# Patient Record
Sex: MALE | Race: WHITE | NOT HISPANIC OR LATINO | Employment: FULL TIME | URBAN - METROPOLITAN AREA
[De-identification: names, ages, dates, MRNs, and addresses within clinical notes are randomized per-mention and may not be internally consistent; named-entity substitution may affect disease eponyms.]

---

## 2020-12-28 ENCOUNTER — CLINICAL SUPPORT (OUTPATIENT)
Dept: URGENT CARE | Facility: CLINIC | Age: 25
End: 2020-12-28
Payer: MEDICAID

## 2020-12-28 DIAGNOSIS — Z11.59 SCREENING FOR VIRAL DISEASE: Primary | ICD-10-CM

## 2020-12-28 LAB
CTP QC/QA: YES
SARS-COV-2 RDRP RESP QL NAA+PROBE: NEGATIVE

## 2020-12-28 PROCEDURE — U0002: ICD-10-PCS | Mod: QW,S$GLB,, | Performed by: PHYSICIAN ASSISTANT

## 2020-12-28 PROCEDURE — U0002 COVID-19 LAB TEST NON-CDC: HCPCS | Mod: QW,S$GLB,, | Performed by: PHYSICIAN ASSISTANT

## 2020-12-28 PROCEDURE — 99211 PR OFFICE/OUTPT VISIT, EST, LEVL I: ICD-10-PCS | Mod: S$GLB,CS,, | Performed by: PHYSICIAN ASSISTANT

## 2020-12-28 PROCEDURE — 99211 OFF/OP EST MAY X REQ PHY/QHP: CPT | Mod: S$GLB,CS,, | Performed by: PHYSICIAN ASSISTANT

## 2020-12-28 NOTE — PATIENT INSTRUCTIONS
Your test was NEGATIVE for COVID-19 (coronavirus).      You may leave home and/or return to work when the following conditions are met:  · 24 hours fever free without fever-reducing medications AND  · Improved symptoms  · You have not met the conditions of a close contact     What counts as a close contact?  · You were within 6 feet of someone who has COVID-19 for a total of 15 minutes or more (masked or unmasked).  · You provided care at home to someone who is sick with COVID-19.  · You had direct physical contact with the person (hugged or kissed them).  · You shared eating or drinking utensils.  · They sneezed, coughed, or somehow got respiratory droplets on you.     If you had a close contact:  · If possible, it is recommended that you quarantine for 14 days from the time of contact regardless of your test status.  · If you have no symptoms, quarantine may be stopped early at 10 days, but this carries a small risk of spreading the virus.  · If you have no symptoms and you have a negative COVID test on day 5 or later, quarantine may be stopped after day 7, but this also carries a small risk of spreading the virus.     Additional instructions:  · Social distance per your local guidelines  · Call ahead before visiting your doctor.  · Wear a mask when around others who do not live in your household.  · Cover your coughs and sneezes.  · Wash hands or use hand  often.      If your symptoms worsen or if you have any other concerns, please contact [facility] at [phone].     Sincerely,    Christina Norris RT

## 2022-02-05 ENCOUNTER — OFFICE VISIT (OUTPATIENT)
Dept: URGENT CARE | Facility: CLINIC | Age: 27
End: 2022-02-05
Payer: MEDICAID

## 2022-02-05 VITALS
HEIGHT: 64 IN | TEMPERATURE: 98 F | DIASTOLIC BLOOD PRESSURE: 76 MMHG | BODY MASS INDEX: 24.75 KG/M2 | RESPIRATION RATE: 18 BRPM | OXYGEN SATURATION: 97 % | WEIGHT: 145 LBS | HEART RATE: 74 BPM | SYSTOLIC BLOOD PRESSURE: 114 MMHG

## 2022-02-05 DIAGNOSIS — K13.0 CELLULITIS OF LIP: Primary | ICD-10-CM

## 2022-02-05 PROCEDURE — 3008F BODY MASS INDEX DOCD: CPT | Mod: CPTII,S$GLB,, | Performed by: FAMILY MEDICINE

## 2022-02-05 PROCEDURE — 1159F PR MEDICATION LIST DOCUMENTED IN MEDICAL RECORD: ICD-10-PCS | Mod: CPTII,S$GLB,, | Performed by: FAMILY MEDICINE

## 2022-02-05 PROCEDURE — 3074F PR MOST RECENT SYSTOLIC BLOOD PRESSURE < 130 MM HG: ICD-10-PCS | Mod: CPTII,S$GLB,, | Performed by: FAMILY MEDICINE

## 2022-02-05 PROCEDURE — 3074F SYST BP LT 130 MM HG: CPT | Mod: CPTII,S$GLB,, | Performed by: FAMILY MEDICINE

## 2022-02-05 PROCEDURE — 3078F DIAST BP <80 MM HG: CPT | Mod: CPTII,S$GLB,, | Performed by: FAMILY MEDICINE

## 2022-02-05 PROCEDURE — 1159F MED LIST DOCD IN RCRD: CPT | Mod: CPTII,S$GLB,, | Performed by: FAMILY MEDICINE

## 2022-02-05 PROCEDURE — 3008F PR BODY MASS INDEX (BMI) DOCUMENTED: ICD-10-PCS | Mod: CPTII,S$GLB,, | Performed by: FAMILY MEDICINE

## 2022-02-05 PROCEDURE — 99213 PR OFFICE/OUTPT VISIT, EST, LEVL III, 20-29 MIN: ICD-10-PCS | Mod: S$GLB,,, | Performed by: FAMILY MEDICINE

## 2022-02-05 PROCEDURE — 3078F PR MOST RECENT DIASTOLIC BLOOD PRESSURE < 80 MM HG: ICD-10-PCS | Mod: CPTII,S$GLB,, | Performed by: FAMILY MEDICINE

## 2022-02-05 PROCEDURE — 99213 OFFICE O/P EST LOW 20 MIN: CPT | Mod: S$GLB,,, | Performed by: FAMILY MEDICINE

## 2022-02-05 RX ORDER — CEPHALEXIN 500 MG/1
500 CAPSULE ORAL EVERY 8 HOURS
Qty: 30 CAPSULE | Refills: 0 | Status: SHIPPED | OUTPATIENT
Start: 2022-02-05 | End: 2022-02-15

## 2022-02-05 RX ORDER — LORATADINE 10 MG/1
10 TABLET ORAL DAILY
Qty: 30 TABLET | Refills: 2 | Status: SHIPPED | OUTPATIENT
Start: 2022-02-05 | End: 2023-02-05

## 2022-02-05 NOTE — PROGRESS NOTES
"Subjective:       Patient ID: Alton Pool is a 26 y.o. male.    Vitals:  height is 5' 4" (1.626 m) and weight is 65.8 kg (145 lb). His axillary temperature is 97.9 °F (36.6 °C). His blood pressure is 114/76 and his pulse is 74. His respiration is 18 and oxygen saturation is 97%.     Chief Complaint: Lip Laceration (Bottom lip swollen)    Pt stated that his bottom lip began to swell up on yesterday and he did not used anything to treat.  Does not recall any bite,  Denies any itching        Facial Injury   The incident occurred 12 to 24 hours ago. The quality of the pain is described as aching. The pain is at a severity of 2/10. The patient is experiencing no pain. The pain has been constant since the injury. He has tried nothing for the symptoms. The treatment provided no relief.       HENT: Positive for facial trauma.        Objective:      Physical Exam   Constitutional: He is oriented to person, place, and time. He appears well-developed and well-nourished. He is cooperative.  Non-toxic appearance. He does not appear ill. No distress.   HENT:   Head: Normocephalic and atraumatic.   Ears:   Right Ear: Hearing, tympanic membrane, external ear and ear canal normal.   Left Ear: Hearing, tympanic membrane, external ear and ear canal normal.   Nose: Nose normal. No mucosal edema, rhinorrhea or nasal deformity. No epistaxis. Right sinus exhibits no maxillary sinus tenderness and no frontal sinus tenderness. Left sinus exhibits no maxillary sinus tenderness and no frontal sinus tenderness.   Mouth/Throat: Uvula is midline, oropharynx is clear and moist and mucous membranes are normal. Mucous membranes are moist. No trismus in the jaw. Normal dentition. No uvula swelling. No posterior oropharyngeal erythema. Oropharynx is clear.      Comments: Lower lip with moderate swelling ,   Small pimple seen on outer edge of lip    Eyes: Conjunctivae and lids are normal. Pupils are equal, round, and reactive to light. Right eye " exhibits no discharge. Left eye exhibits no discharge. No scleral icterus.      extraocular movement intact   Neck: Trachea normal and phonation normal. Neck supple.   Cardiovascular: Normal rate, regular rhythm, normal heart sounds, intact distal pulses and normal pulses.   Pulmonary/Chest: Effort normal and breath sounds normal. No respiratory distress.   Abdominal: Normal appearance and bowel sounds are normal. He exhibits no distension, no pulsatile midline mass and no mass. Soft. There is no abdominal tenderness.   Musculoskeletal: Normal range of motion.         General: No deformity or edema. Normal range of motion.   Neurological: He is alert and oriented to person, place, and time. He exhibits normal muscle tone. Coordination normal.   Skin: Skin is warm, dry, intact, not diaphoretic and not pale.   Psychiatric: He has a normal mood and affect. His speech is normal and behavior is normal. Judgment and thought content normal. Cognition and memory  Nursing note and vitals reviewed.        Assessment:       1. Cellulitis of lip          Plan:         Cellulitis of lip    Other orders  -     cephALEXin (KEFLEX) 500 MG capsule; Take 1 capsule (500 mg total) by mouth every 8 (eight) hours. for 10 days  Dispense: 30 capsule; Refill: 0  -     loratadine (CLARITIN) 10 mg tablet; Take 1 tablet (10 mg total) by mouth once daily.  Dispense: 30 tablet; Refill: 2          Apply ice    If increase swelling or difficulty swallowing RTC or to ER

## 2022-02-05 NOTE — PATIENT INSTRUCTIONS
Patient Education       Cellulitis and Erysipelas (Skin Infections)   The Basics   Written by the doctors and editors at [facility]   What are cellulitis and erysipelas? -- Cellulitis and erysipelas are both infections of the skin. These infections can cause redness, pain, and swelling. The difference between them is that erysipelas tends to affect the upper layers of skin, and cellulitis tends to affect deep layers of skin and sometimes the fat under the skin.  Cellulitis and erysipelas can happen when germs get into the skin. Normally, different types of germs live on a person's skin. Most of the time, these germs do not cause any problems. But if a person gets a cut or a break in the skin, the germs can get into the skin and cause an infection.  Certain conditions can increase a person's chance of getting cellulitis or erysipelas. These include:  · Having a cut (even a tiny one)  · Having another type of skin infection or a long-term skin condition  · Having swelling of the skin or swelling in the body  · Being overweight  What are the symptoms of cellulitis and erysipelas? -- Both types of infection cause very similar symptoms. Either infection can cause the infected area to be painful, red, swollen, or warm. Some people with cellulitis or erysipelas can sometimes also have fever or chills. And sometimes, people with these infections have no symptoms or only some of these symptoms.  Most of the time, cellulitis and erysipelas happen on the legs or arms. But people can get these infections in other places, such as the belly, the face, in the mouth, or around the anus.  Will I need tests? -- Most people do not need any tests. Your doctor or nurse will do an exam and look at your skin.  It's important for a doctor or nurse to do an exam to figure out what kind of infection you have. The right treatment for a skin infection depends on the type of infection it is and which germs are causing it. Your doctor or nurse  "might need to do tests to figure out the cause of your infection.  If you have cellulitis or erysipelas, it's important to get treated. These infections can spread to the whole body and become serious if not treated.  How are cellulitis and erysipelas treated? -- These infections are treated with antibiotic pills. If your doctor prescribes medicine for you to take at home, it is important to follow the directions exactly. Take all of the pills you are given, even if you feel better before you finish them. If you do not take all the pills, the infection can come back worse.  People who have severe infections might be treated in the hospital and given antibiotics through a thin tube that goes into the vein, called an "IV".  What can I do to help treat my infection? -- You can:  · Raise your arm or leg (if your infection is on your arm or leg) to reduce swelling - Raise the arm or leg up above the level of your heart 3 or 4 times a day, for 30 minutes each time.  · Keep the infected area clean and dry - You can take a shower or bath, but be sure to pat the area dry with a towel afterward.  Antibiotic ointments or creams do not work to treat cellulitis and erysipelas.  Should I call my doctor or nurse? -- You should call your doctor or nurse if your symptoms do not get better within 3 days of starting treatment. You should also call if the red area gets:  · Bigger  · More swollen  · More painful  Your doctor or nurse might do another exam or tests to see if you need different medicines.  Can skin infections be prevented? -- Sometimes. If you cut your skin, make sure to wash the area well with soap and water. This can help prevent the area from getting infected. If you have a long-term skin condition, ask your doctor or nurse what you can do to help prevent infections.  All topics are updated as new evidence becomes available and our peer review process is complete.  This topic retrieved from [employer] on: Sep 21, " 2021.  Topic 61783 Version 10.0  Release: 29.4.2 - C29.263  © 2021 UpToDate, Inc. and/or its affiliates. All rights reserved.  Consumer Information Use and Disclaimer   This information is not specific medical advice and does not replace information you receive from your health care provider. This is only a brief summary of general information. It does NOT include all information about conditions, illnesses, injuries, tests, procedures, treatments, therapies, discharge instructions or life-style choices that may apply to you. You must talk with your health care provider for complete information about your health and treatment options. This information should not be used to decide whether or not to accept your health care provider's advice, instructions or recommendations. Only your health care provider has the knowledge and training to provide advice that is right for you. The use of this information is governed by the [employer] End User License Agreement, available at https://www.expressor software.[employer]/en/solutions/[facility]/[email or web address]/daryn.The use of [employer] content is governed by the [employer] Terms of Use. ©2021 UpToDate, Inc. All rights reserved.  Copyright   © 2021 UpToDate, Inc. and/or its affiliates. All rights reserved.

## 2022-08-27 ENCOUNTER — OFFICE VISIT (OUTPATIENT)
Dept: URGENT CARE | Facility: CLINIC | Age: 27
End: 2022-08-27
Payer: COMMERCIAL

## 2022-08-27 VITALS
SYSTOLIC BLOOD PRESSURE: 129 MMHG | OXYGEN SATURATION: 98 % | BODY MASS INDEX: 26.29 KG/M2 | HEIGHT: 64 IN | DIASTOLIC BLOOD PRESSURE: 88 MMHG | RESPIRATION RATE: 18 BRPM | WEIGHT: 154 LBS | HEART RATE: 78 BPM | TEMPERATURE: 98 F

## 2022-08-27 DIAGNOSIS — S81.812A LACERATION OF LEFT LOWER LEG, INITIAL ENCOUNTER: Primary | ICD-10-CM

## 2022-08-27 PROCEDURE — 3008F BODY MASS INDEX DOCD: CPT | Mod: CPTII,S$GLB,, | Performed by: PHYSICIAN ASSISTANT

## 2022-08-27 PROCEDURE — 1160F PR REVIEW ALL MEDS BY PRESCRIBER/CLIN PHARMACIST DOCUMENTED: ICD-10-PCS | Mod: CPTII,S$GLB,, | Performed by: PHYSICIAN ASSISTANT

## 2022-08-27 PROCEDURE — 13121 CMPLX RPR S/A/L 2.6-7.5 CM: CPT | Mod: S$GLB,,, | Performed by: PHYSICIAN ASSISTANT

## 2022-08-27 PROCEDURE — 1159F PR MEDICATION LIST DOCUMENTED IN MEDICAL RECORD: ICD-10-PCS | Mod: CPTII,S$GLB,, | Performed by: PHYSICIAN ASSISTANT

## 2022-08-27 PROCEDURE — 12032 INTMD RPR S/A/T/EXT 2.6-7.5: CPT | Mod: S$GLB,,, | Performed by: PHYSICIAN ASSISTANT

## 2022-08-27 PROCEDURE — 3079F PR MOST RECENT DIASTOLIC BLOOD PRESSURE 80-89 MM HG: ICD-10-PCS | Mod: CPTII,S$GLB,, | Performed by: PHYSICIAN ASSISTANT

## 2022-08-27 PROCEDURE — 12032 LACERATION REPAIR: ICD-10-PCS | Mod: S$GLB,,, | Performed by: PHYSICIAN ASSISTANT

## 2022-08-27 PROCEDURE — 3074F PR MOST RECENT SYSTOLIC BLOOD PRESSURE < 130 MM HG: ICD-10-PCS | Mod: CPTII,S$GLB,, | Performed by: PHYSICIAN ASSISTANT

## 2022-08-27 PROCEDURE — 99213 PR OFFICE/OUTPT VISIT, EST, LEVL III, 20-29 MIN: ICD-10-PCS | Mod: 25,S$GLB,, | Performed by: PHYSICIAN ASSISTANT

## 2022-08-27 PROCEDURE — 3074F SYST BP LT 130 MM HG: CPT | Mod: CPTII,S$GLB,, | Performed by: PHYSICIAN ASSISTANT

## 2022-08-27 PROCEDURE — 13121 LACERATION REPAIR: ICD-10-PCS | Mod: S$GLB,,, | Performed by: PHYSICIAN ASSISTANT

## 2022-08-27 PROCEDURE — 3008F PR BODY MASS INDEX (BMI) DOCUMENTED: ICD-10-PCS | Mod: CPTII,S$GLB,, | Performed by: PHYSICIAN ASSISTANT

## 2022-08-27 PROCEDURE — 3079F DIAST BP 80-89 MM HG: CPT | Mod: CPTII,S$GLB,, | Performed by: PHYSICIAN ASSISTANT

## 2022-08-27 PROCEDURE — 99213 OFFICE O/P EST LOW 20 MIN: CPT | Mod: 25,S$GLB,, | Performed by: PHYSICIAN ASSISTANT

## 2022-08-27 PROCEDURE — 1159F MED LIST DOCD IN RCRD: CPT | Mod: CPTII,S$GLB,, | Performed by: PHYSICIAN ASSISTANT

## 2022-08-27 PROCEDURE — 1160F RVW MEDS BY RX/DR IN RCRD: CPT | Mod: CPTII,S$GLB,, | Performed by: PHYSICIAN ASSISTANT

## 2022-08-27 RX ORDER — MUPIROCIN 20 MG/G
OINTMENT TOPICAL 3 TIMES DAILY
Qty: 1 G | Refills: 0 | Status: SHIPPED | OUTPATIENT
Start: 2022-08-27

## 2022-08-27 NOTE — PATIENT INSTRUCTIONS
Monitor for evidence of infection. Wash and dry the area with regular soap and water. Do not use peroxide! If you develop swelling, redness, warmth or drainage from the wound, you should be evaluated as soon as possible. Sutures should be removed in 7-10 days (you can return here or be seen a primary care provider's clinic).     If not allergic,take tylenol (acetominophen) for fever control, chills, or body aches every 4 hours. Do not exceed 4000 mg/ day.If not allergic, take Motrin (Ibuprofen) every 4 hours for fever, chills, pain or inflammation. Do not exceed 2400 mg/day. You can alternate taking tylenol and motrin.     You must understand that you've received an Urgent Care treatment only and that you may be released before all your medical problems are known or treated. You, the patient, will arrange for follow up care as instructed.      Follow up with your PCP or specialty clinic as instructed in the next 2-3 days if not improved or as needed. You can call [phone] to schedule an appointment with appropriate provider.      If you condition worsens, we recommend that you receive another evaluation at the emergency room immediately or contact your primary medical clinic's after hours call service to discuss your concerns.      Please return here or go to the Emergency Department for any concerns or worsening condition.      If you were prescribed a narcotic or controlled substance, do not drive or operate heavy equipment or machinery while taking these medications.

## 2022-08-27 NOTE — PROGRESS NOTES
"Subjective:       Patient ID: Alton Pool is a 27 y.o. male.    Vitals:  height is 5' 4" (1.626 m) and weight is 69.9 kg (154 lb). His temperature is 98.2 °F (36.8 °C). His blood pressure is 129/88 and his pulse is 78. His respiration is 18 and oxygen saturation is 98%.     Chief Complaint: Laceration    Patient presents to the clinic with an injury to the left leg x today    Patient provider note starts here:  Patient presents to the clinic with complaints of a laceration to the left lower leg sustained today.  Reports that [place] there was a miscommunication with his brother who he was lifting it with causing him to be cut with a concrete on the pole. Incident occurred about an hour prior to arrival. Denies other injuries. Tetanus is UTD (2021).     Leg Pain   The incident occurred 1 to 3 hours ago. The injury mechanism was a fall. The pain is present in the left leg. The pain is at a severity of 2/10. The pain is mild. The pain has been Fluctuating since onset. Pertinent negatives include no numbness. Possible foreign bodies include glass. He has tried nothing for the symptoms. The treatment provided no relief.     Constitution: Negative for chills and fever.   HENT:  Negative for sore throat.    Neck: Negative for neck pain and neck stiffness.   Cardiovascular:  Negative for chest pain.   Respiratory:  Negative for chest tightness, cough and wheezing.    Gastrointestinal:  Negative for abdominal pain, vomiting and diarrhea.   Musculoskeletal:  Negative for pain.   Skin:  Positive for wound and laceration. Negative for rash.   Allergic/Immunologic: Negative for itching.   Neurological:  Negative for numbness and tingling.     Objective:      Physical Exam   Constitutional: He is oriented to person, place, and time. He appears well-developed. He is cooperative.  Non-toxic appearance. He does not appear ill. No distress.   HENT:   Head: Normocephalic and atraumatic. "   Ears:   Right Ear: Hearing and external ear normal.   Left Ear: Hearing and external ear normal.   Nose: Nose normal. No mucosal edema, rhinorrhea or nasal deformity. No epistaxis. Right sinus exhibits no maxillary sinus tenderness and no frontal sinus tenderness. Left sinus exhibits no maxillary sinus tenderness and no frontal sinus tenderness.   Mouth/Throat: Uvula is midline, oropharynx is clear and moist and mucous membranes are normal. No trismus in the jaw. Normal dentition. No uvula swelling. No posterior oropharyngeal erythema.   Eyes: Conjunctivae and lids are normal. Right eye exhibits no discharge. Left eye exhibits no discharge. No scleral icterus.   Neck: Trachea normal and phonation normal. Neck supple.   Cardiovascular: Normal rate and normal pulses.   Pulmonary/Chest: Effort normal. No respiratory distress.   Abdominal: Normal appearance. There is no abdominal tenderness.   Musculoskeletal: Normal range of motion.         General: No deformity. Normal range of motion.   Neurological: He is alert and oriented to person, place, and time. He exhibits normal muscle tone. Coordination normal.   Skin: Skin is warm, dry, intact, not diaphoretic and not pale.         Comments: 2 lacerations noted to the anterior aspect of the left lower extremity. There is no active bleeding. One of the lacerations contains a flap of skin and a FB of glass is noted under the flap of skin.      Psychiatric: His speech is normal and behavior is normal. Judgment and thought content normal.   Nursing note and vitals reviewed.      Assessment:       1. Laceration of left lower leg, initial encounter        Laceration Repair    Date/Time: 8/27/2022 12:15 PM  Performed by: Denise Wyatt PA-C  Authorized by: Denise Wyatt PA-C   Consent Done: Yes  Consent: Verbal consent obtained.  Risks and benefits: risks, benefits and alternatives were discussed  Consent given by: patient  Body area: lower extremity  Location  details: left lower leg  Laceration length: 3 cm  Foreign bodies: no foreign bodies  Tendon involvement: none  Nerve involvement: none  Anesthesia: local infiltration    Anesthesia:  Local Anesthetic: lidocaine 1% without epinephrine  Anesthetic total: 2 mL  Preparation: Patient was prepped and draped in the usual sterile fashion.  Irrigation solution: saline  Irrigation method: syringe  Amount of cleaning: extensive  Skin closure: 4-0 nylon  Number of sutures: 3  Technique: simple  Approximation: close  Approximation difficulty: simple  Dressing: dressing applied and non-stick sterile dressing  Patient tolerance: Patient tolerated the procedure well with no immediate complications        Laceration Repair    Date/Time: 8/27/2022 12:15 PM  Performed by: Denise Wyatt PA-C  Authorized by: Denise Wyatt PA-C   Consent Done: Yes  Consent: Verbal consent obtained.  Risks and benefits: risks, benefits and alternatives were discussed  Consent given by: patient  Body area: lower extremity  Location details: left lower leg  Laceration length: 5 cm  Foreign bodies: glass  Tendon involvement: none  Nerve involvement: none  Anesthesia: local infiltration    Anesthesia:  Local Anesthetic: lidocaine 1% without epinephrine  Anesthetic total: 4 mL  Preparation: Patient was prepped and draped in the usual sterile fashion.  Irrigation solution: saline  Irrigation method: syringe  Amount of cleaning: extensive  Debridement: moderate  Skin closure: 4-0 nylon  Number of sutures: 7  Technique: simple  Approximation: close  Approximation difficulty: complex  Dressing: dressing applied and non-stick sterile dressing  Patient tolerance: Patient tolerated the procedure well with no immediate complications        Plan:         Laceration of left lower leg, initial encounter  -     mupirocin (BACTROBAN) 2 % ointment; Apply topically 3 (three) times daily.  Dispense: 1 g; Refill: 0  -     Laceration Repair  -     Laceration  Repair         Medical Decision Making:   History:   Old Medical Records: I decided to obtain old medical records.  Differential Diagnosis:   Differential diagnosis includes but is not limited to: laceration, abrasion, retained FB, tendon injury   Urgent Care Management:  Patient presents with complaints of 2 lacerations to the left lower leg.  On exam, he is afebrile and nontoxic appearing.  Alert piece of glass was removed from the superior most laceration which did contain a flap of skin.  Lacerations were sutured by myself without complications.  Advised wound care and suture removal in 7-10 days.  Tetanus UTD. Patient verbalized understanding and agreed with plan.     Patient Instructions   Monitor for evidence of infection. Wash and dry the area with regular soap and water. Do not use peroxide! If you develop swelling, redness, warmth or drainage from the wound, you should be evaluated as soon as possible. Sutures should be removed in 7-10 days (you can return here or be seen a primary care provider's clinic).     If not allergic,take tylenol (acetominophen) for fever control, chills, or body aches every 4 hours. Do not exceed 4000 mg/ day.If not allergic, take Motrin (Ibuprofen) every 4 hours for fever, chills, pain or inflammation. Do not exceed 2400 mg/day. You can alternate taking tylenol and motrin.     You must understand that you've received an Urgent Care treatment only and that you may be released before all your medical problems are known or treated. You, the patient, will arrange for follow up care as instructed.      Follow up with your PCP or specialty clinic as instructed in the next 2-3 days if not improved or as needed. You can call [phone] to schedule an appointment with appropriate provider.      If you condition worsens, we recommend that you receive another evaluation at the emergency room immediately or contact your primary medical clinic's after hours call service to discuss your  concerns.      Please return here or go to the Emergency Department for any concerns or worsening condition.      If you were prescribed a narcotic or controlled substance, do not drive or operate heavy equipment or machinery while taking these medications.

## 2022-08-28 ENCOUNTER — CLINICAL SUPPORT (OUTPATIENT)
Dept: URGENT CARE | Facility: CLINIC | Age: 27
End: 2022-08-28
Payer: MEDICAID

## 2022-08-28 ENCOUNTER — TELEPHONE (OUTPATIENT)
Dept: URGENT CARE | Facility: CLINIC | Age: 27
End: 2022-08-28

## 2022-08-28 VITALS
HEIGHT: 64 IN | HEART RATE: 76 BPM | WEIGHT: 154 LBS | BODY MASS INDEX: 26.29 KG/M2 | OXYGEN SATURATION: 95 % | RESPIRATION RATE: 12 BRPM | SYSTOLIC BLOOD PRESSURE: 117 MMHG | DIASTOLIC BLOOD PRESSURE: 77 MMHG | TEMPERATURE: 98 F

## 2022-08-28 DIAGNOSIS — Z51.89 VISIT FOR WOUND CHECK: Primary | ICD-10-CM

## 2022-08-28 PROCEDURE — 99499 NO LOS: ICD-10-PCS | Mod: S$GLB,,, | Performed by: PHYSICIAN ASSISTANT

## 2022-08-28 PROCEDURE — 99499 UNLISTED E&M SERVICE: CPT | Mod: S$GLB,,, | Performed by: PHYSICIAN ASSISTANT

## 2022-08-28 NOTE — TELEPHONE ENCOUNTER
Called and checked on patient to ensure that there has not been continued bleeding from the sutures. Reports that the wounds have not bled since early this morning prior to reevaluation here.

## 2022-08-28 NOTE — PROGRESS NOTES
"Subjective:       Patient ID: Alton Pool is a 27 y.o. male.    Vitals:  height is 5' 4" (1.626 m) and weight is 69.9 kg (154 lb). His temperature is 98 °F (36.7 °C). His blood pressure is 117/77 and his pulse is 76. His respiration is 12 and oxygen saturation is 95%.     Chief Complaint: Wound Check    Pt was seen yesterday for laceration, since leaving the clinic he has been having bleeding at the bottom of wound and this morning the top of wound began bleeding. It currently is not bleeding and pt denies any pus or liquid coming from the area. Pt does have swelling and what he describes as thin red blood coming from the area.     Patient provider note starts here:  Patient presents to the clinic with complaints of some bleeding from the lacerations which were sutured by me yesterday. Reports that he had some bleeding yesterday and some again this morning from the lateral/inferior aspect of the inferior laceration. No bleeding currently.       Wound Check  He was originally treated yesterday.     Constitution: Negative for chills and fever.   HENT:  Negative for sore throat.    Neck: Negative for neck pain and neck stiffness.   Cardiovascular:  Negative for chest pain.   Respiratory:  Negative for chest tightness, cough and wheezing.    Gastrointestinal:  Negative for abdominal pain, vomiting and diarrhea.   Musculoskeletal:  Negative for pain.   Skin:  Positive for laceration (s/p surture palcement yesterday). Negative for rash, wound, erythema and bruising.   Allergic/Immunologic: Negative for itching.   Neurological:  Negative for numbness and tingling.     Objective:      Physical Exam   Constitutional: He is oriented to person, place, and time. He appears well-developed.   HENT:   Head: Normocephalic and atraumatic. Head is without abrasion, without contusion and without laceration.   Ears:   Right Ear: External ear normal.   Left Ear: External ear normal.   Nose: Nose normal.   Mouth/Throat: Oropharynx is " clear and moist and mucous membranes are normal.   Eyes: Conjunctivae, EOM and lids are normal. Pupils are equal, round, and reactive to light.   Neck: Trachea normal and phonation normal. Neck supple.   Cardiovascular: Normal rate.   Pulmonary/Chest: Effort normal. No stridor. No respiratory distress.   Musculoskeletal: Normal range of motion.         General: Normal range of motion.   Neurological: He is alert and oriented to person, place, and time.   Skin: Skin is warm, dry, intact and no rash. Capillary refill takes less than 2 seconds. No abrasion, No burn, No bruising, No erythema and No ecchymosis         Comments: Two lacerations noted with sutures in place.  No active bleeding.  There is some dried blood noted to the skin.  Mild swelling surrounding the laceration sites likely from the trauma.  No evidence of infection.   Psychiatric: His speech is normal and behavior is normal. Judgment and thought content normal.   Nursing note and vitals reviewed.      Assessment:       1. Visit for wound check          Plan:         Visit for wound check         Medical Decision Making:   History:   Old Medical Records: I decided to obtain old medical records.  Urgent Care Management:  Patient presents with complaints of some bleeding from the lacerations which were sutured by myself yesterday.  On exam, he is afebrile and nontoxic appearing.  No active bleeding noted.  I encouraged the patient wound care and pressure dressing if bleeding persists.  I believe that the lacerations appear well overall.  There is mild swelling which is likely related to trauma.  I do not suspect infectious causes at this time.  Advised follow-up in 7-10 days for suture removal.  He verbalized understanding.

## 2022-09-05 ENCOUNTER — CLINICAL SUPPORT (OUTPATIENT)
Dept: URGENT CARE | Facility: CLINIC | Age: 27
End: 2022-09-05
Payer: MEDICAID

## 2022-09-05 VITALS
HEART RATE: 56 BPM | WEIGHT: 154 LBS | DIASTOLIC BLOOD PRESSURE: 71 MMHG | OXYGEN SATURATION: 98 % | RESPIRATION RATE: 17 BRPM | SYSTOLIC BLOOD PRESSURE: 111 MMHG | TEMPERATURE: 98 F | HEIGHT: 64 IN | BODY MASS INDEX: 26.29 KG/M2

## 2022-09-05 DIAGNOSIS — Z48.02 VISIT FOR SUTURE REMOVAL: Primary | ICD-10-CM

## 2022-09-05 PROCEDURE — 99499 NO LOS: ICD-10-PCS | Mod: S$GLB,,, | Performed by: PHYSICIAN ASSISTANT

## 2022-09-05 PROCEDURE — 99024 SUTURE REMOVAL: ICD-10-PCS | Mod: S$GLB,,, | Performed by: PHYSICIAN ASSISTANT

## 2022-09-05 PROCEDURE — 99499 UNLISTED E&M SERVICE: CPT | Mod: S$GLB,,, | Performed by: PHYSICIAN ASSISTANT

## 2022-09-05 PROCEDURE — 99024 POSTOP FOLLOW-UP VISIT: CPT | Mod: S$GLB,,, | Performed by: PHYSICIAN ASSISTANT

## 2022-09-05 NOTE — PROCEDURES
Suture Removal    Date/Time: 9/5/2022 10:30 AM  Location procedure was performed: [facility]  Performed by: Enedelia Patel PA-C  Authorized by: Enedelia Patel PA-C   Pre-operative diagnosis: laceration  Post-operative diagnosis: laceration  Body area: lower extremity  Location details: left lower leg  Wound Appearance: clean, well healed, normal color, nontender, no drainage and nonpurulent  Sutures Removed: 12  Facility: sutures placed in this facility  Complications: No  Estimated blood loss (mL): 0  Specimens: No  Implants: No  Patient tolerance: Patient tolerated the procedure well with no immediate complications

## 2022-09-05 NOTE — PROGRESS NOTES
"Subjective:       Patient ID: Alton Pool is a 27 y.o. male.    Vitals:  height is 5' 4" (1.626 m) and weight is 69.9 kg (154 lb). His temperature is 98 °F (36.7 °C). His blood pressure is 111/71 and his pulse is 56 (abnormal). His respiration is 17 and oxygen saturation is 98%.     Chief Complaint: Suture / Staple Removal    Mr. Pool presents for suture removal.  He had 12 sutures placed to the left lower extremity on 08/27/22.  He denies any pus drainage, fever, chills.  He has been applying Bactroban ointment as directed.  He reports the area is still little sore, but pain is improved.    Suture / Staple Removal  The sutures were placed 7 to 10 days ago. He tried regular soap and water washings since the wound repair. The treatment provided mild relief. His temperature was unmeasured prior to arrival. There has been no drainage from the wound. There is no redness present. The swelling has improved. There is no pain present.     Constitution: Negative for chills, sweating, fatigue and fever.   HENT:  Negative for ear pain, ear discharge, congestion, postnasal drip, sinus pain, sinus pressure and sore throat.    Neck: Negative for neck pain.   Cardiovascular:  Negative for chest trauma, chest pain, leg swelling, palpitations, sob on exertion and passing out.   Eyes:  Negative for blurred vision.   Respiratory:  Negative for cough and shortness of breath.    Gastrointestinal:  Negative for abdominal pain, nausea, vomiting and diarrhea.   Genitourinary:  Negative for dysuria, frequency and urgency.   Musculoskeletal:  Negative for pain and trauma.   Skin:  Positive for wound. Negative for rash, erythema and hives.   Allergic/Immunologic: Negative for hives and itching.   Neurological:  Negative for dizziness, history of vertigo, light-headedness, passing out, facial drooping, speech difficulty, coordination disturbances, loss of balance, headaches and altered mental status.   Hematologic/Lymphatic: Negative " for easy bruising/bleeding. Does not bruise/bleed easily.   Psychiatric/Behavioral:  Negative for altered mental status.      Objective:      Physical Exam   Constitutional: He is oriented to person, place, and time. He appears well-developed.   HENT:   Head: Normocephalic and atraumatic. Head is without abrasion, without contusion and without laceration.   Ears:   Right Ear: External ear normal.   Left Ear: External ear normal.   Nose: Nose normal.   Mouth/Throat: Oropharynx is clear and moist and mucous membranes are normal.   Eyes: Conjunctivae, EOM and lids are normal. Pupils are equal, round, and reactive to light.   Neck: Trachea normal and phonation normal. Neck supple.   Cardiovascular: Normal rate, regular rhythm and normal heart sounds.   Pulmonary/Chest: Effort normal and breath sounds normal. No stridor. No respiratory distress.   Musculoskeletal: Normal range of motion.         General: Normal range of motion.   Neurological: He is alert and oriented to person, place, and time.   Skin: Skin is warm, dry, intact and no rash. Capillary refill takes less than 2 seconds. No abrasion, No burn, No bruising, No erythema and No ecchymosis        Psychiatric: His speech is normal and behavior is normal. Judgment and thought content normal.   Nursing note and vitals reviewed.      Assessment:       1. Visit for suture removal          Plan:         Visit for suture removal  -     Suture Removal    Diagnoses and plan discussed with the patient, as well as the expected course and duration of his symptoms.  All questions and concerns were addressed prior to discharge.  He was advised to follow up with his PCP within 1 week if symptoms do not improve.  Emergency department precautions were given.  Patient verbalized understanding and was happy with the plan of care.   Note dictated with voice recognition software, please excuse any grammatical errors.    Patient Instructions   PLEASE READ YOUR DISCHARGE INSTRUCTIONS  ENTIRELY AS IT CONTAINS IMPORTANT INFORMATION.  Clean the wound twice daily with soap & water.  Do not soak in any water & do not get in pools, hot tubs, or the ocean/any body of water until healed.  Keep covered.  You may apply bactroban once a day to the wound.    - Rest.    - Drink plenty of fluids.    - Tylenol or Ibuprofen as directed as needed for fever/pain.    - If you were prescribed antibiotics, please take them to completion.  - If you are female and on birth control pills - please use additional methods of contraception to prevent pregnancy while on antibiotics and for one cycle after.   - If you were prescribed a narcotic medication, a cough syrup, or a muscle relaxer, do not drive or operate heavy equipment or machinery while taking these medications, as they can cause drowsiness.   - If a referral to a specialty was made today, you should receive a phone call in the next few days to schedule an appt.  Please call [phone] to schedule an appt if have not gotten a phone call in the next few days.  - If you smoke, please stop smoking.  -You must understand that you've received an Urgent Care treatment only and that you may be released before all your medical problems are known or treated. You, the patient, will arrange for follow up care as instructed. Please arrange follow up with your primary medical clinic as soon as possible.   - Follow up with your PCP or specialty clinic as directed in the next 1-2 weeks if not improved or as needed.  You can call [phone] to schedule an appointment with the appropriate provider.    - Please return to Urgent Care or to the Emergency Department if your symptoms worsen.    Patient aware and verbalized understanding.

## 2022-09-05 NOTE — PATIENT INSTRUCTIONS
PLEASE READ YOUR DISCHARGE INSTRUCTIONS ENTIRELY AS IT CONTAINS IMPORTANT INFORMATION.  Clean the wound twice daily with soap & water.  Do not soak in any water & do not get in pools, hot tubs, or the ocean/any body of water until healed.  Keep covered.  You may apply bactroban once a day to the wound.    - Rest.    - Drink plenty of fluids.    - Tylenol or Ibuprofen as directed as needed for fever/pain.    - If you were prescribed antibiotics, please take them to completion.  - If you are female and on birth control pills - please use additional methods of contraception to prevent pregnancy while on antibiotics and for one cycle after.   - If you were prescribed a narcotic medication, a cough syrup, or a muscle relaxer, do not drive or operate heavy equipment or machinery while taking these medications, as they can cause drowsiness.   - If a referral to a specialty was made today, you should receive a phone call in the next few days to schedule an appt.  Please call [phone] to schedule an appt if have not gotten a phone call in the next few days.  - If you smoke, please stop smoking.  -You must understand that you've received an Urgent Care treatment only and that you may be released before all your medical problems are known or treated. You, the patient, will arrange for follow up care as instructed. Please arrange follow up with your primary medical clinic as soon as possible.   - Follow up with your PCP or specialty clinic as directed in the next 1-2 weeks if not improved or as needed.  You can call [phone] to schedule an appointment with the appropriate provider.    - Please return to Urgent Care or to the Emergency Department if your symptoms worsen.    Patient aware and verbalized understanding.

## 2022-09-09 ENCOUNTER — OFFICE VISIT (OUTPATIENT)
Dept: URGENT CARE | Facility: CLINIC | Age: 27
End: 2022-09-09
Payer: COMMERCIAL

## 2022-09-09 VITALS
WEIGHT: 154 LBS | BODY MASS INDEX: 26.29 KG/M2 | HEART RATE: 69 BPM | HEIGHT: 64 IN | DIASTOLIC BLOOD PRESSURE: 74 MMHG | OXYGEN SATURATION: 97 % | TEMPERATURE: 98 F | RESPIRATION RATE: 14 BRPM | SYSTOLIC BLOOD PRESSURE: 109 MMHG

## 2022-09-09 DIAGNOSIS — L08.9 INFECTED WOUND: Primary | ICD-10-CM

## 2022-09-09 DIAGNOSIS — T14.8XXA INFECTED WOUND: Primary | ICD-10-CM

## 2022-09-09 PROCEDURE — 3078F DIAST BP <80 MM HG: CPT | Mod: CPTII,S$GLB,, | Performed by: PHYSICIAN ASSISTANT

## 2022-09-09 PROCEDURE — 3078F PR MOST RECENT DIASTOLIC BLOOD PRESSURE < 80 MM HG: ICD-10-PCS | Mod: CPTII,S$GLB,, | Performed by: PHYSICIAN ASSISTANT

## 2022-09-09 PROCEDURE — 3008F BODY MASS INDEX DOCD: CPT | Mod: CPTII,S$GLB,, | Performed by: PHYSICIAN ASSISTANT

## 2022-09-09 PROCEDURE — 1160F PR REVIEW ALL MEDS BY PRESCRIBER/CLIN PHARMACIST DOCUMENTED: ICD-10-PCS | Mod: CPTII,S$GLB,, | Performed by: PHYSICIAN ASSISTANT

## 2022-09-09 PROCEDURE — 3074F SYST BP LT 130 MM HG: CPT | Mod: CPTII,S$GLB,, | Performed by: PHYSICIAN ASSISTANT

## 2022-09-09 PROCEDURE — 99213 PR OFFICE/OUTPT VISIT, EST, LEVL III, 20-29 MIN: ICD-10-PCS | Mod: S$GLB,,, | Performed by: PHYSICIAN ASSISTANT

## 2022-09-09 PROCEDURE — 1159F PR MEDICATION LIST DOCUMENTED IN MEDICAL RECORD: ICD-10-PCS | Mod: CPTII,S$GLB,, | Performed by: PHYSICIAN ASSISTANT

## 2022-09-09 PROCEDURE — 99213 OFFICE O/P EST LOW 20 MIN: CPT | Mod: S$GLB,,, | Performed by: PHYSICIAN ASSISTANT

## 2022-09-09 PROCEDURE — 3074F PR MOST RECENT SYSTOLIC BLOOD PRESSURE < 130 MM HG: ICD-10-PCS | Mod: CPTII,S$GLB,, | Performed by: PHYSICIAN ASSISTANT

## 2022-09-09 PROCEDURE — 1159F MED LIST DOCD IN RCRD: CPT | Mod: CPTII,S$GLB,, | Performed by: PHYSICIAN ASSISTANT

## 2022-09-09 PROCEDURE — 3008F PR BODY MASS INDEX (BMI) DOCUMENTED: ICD-10-PCS | Mod: CPTII,S$GLB,, | Performed by: PHYSICIAN ASSISTANT

## 2022-09-09 PROCEDURE — 1160F RVW MEDS BY RX/DR IN RCRD: CPT | Mod: CPTII,S$GLB,, | Performed by: PHYSICIAN ASSISTANT

## 2022-09-09 RX ORDER — SULFAMETHOXAZOLE AND TRIMETHOPRIM 800; 160 MG/1; MG/1
1 TABLET ORAL 2 TIMES DAILY
Qty: 20 TABLET | Refills: 0 | Status: SHIPPED | OUTPATIENT
Start: 2022-09-09

## 2022-09-09 NOTE — PROGRESS NOTES
"Subjective:       Patient ID: Alton Pool is a 27 y.o. male.    Vitals:  height is 5' 4" (1.626 m) and weight is 69.9 kg (154 lb). His temperature is 97.9 °F (36.6 °C). His blood pressure is 109/74 and his pulse is 69. His respiration is 14 and oxygen saturation is 97%.     Chief Complaint: Wound Check    Pt had suture removal on Monday and Tuesday he did notice some swelling with puss coming out. Pt says at the bottom where sutures were remove feels like a knot in the area. There is pain in the location of the knot that pt rates 6.    Patient provider note starts here:  Patient presents with complaints of swelling and pain tot he inferior aspect of the inferior wound on the left lower leg. Reports that his sutures were removed 4 days ago and the following day he developed the swelling and pain. Reports mild serous drainage from the area. Denies fevers, chills or body aches.     Wound Check  He was originally treated 10 to 14 days ago. Previous treatment included laceration repair. The maximum temperature noted was less than 100.4 F. There has been clear discharge from the wound. There is new swelling present. There is new pain present.     Constitution: Negative for chills and fever.   HENT:  Negative for sore throat.    Neck: Negative for neck pain and neck stiffness.   Cardiovascular:  Negative for chest pain.   Respiratory:  Negative for chest tightness, cough and wheezing.    Gastrointestinal:  Negative for abdominal pain, vomiting and diarrhea.   Musculoskeletal:  Negative for pain.   Skin:  Positive for wound and erythema. Negative for rash.   Allergic/Immunologic: Negative for itching.   Neurological:  Negative for numbness and tingling.     Objective:      Physical Exam   Constitutional: He is oriented to person, place, and time. He appears well-developed.   HENT:   Head: Normocephalic and atraumatic. Head is without abrasion, without contusion and without laceration.   Ears:   Right Ear: External ear " normal.   Left Ear: External ear normal.   Nose: Nose normal.   Mouth/Throat: Oropharynx is clear and moist and mucous membranes are normal.   Eyes: Conjunctivae, EOM and lids are normal. Pupils are equal, round, and reactive to light.   Neck: Trachea normal and phonation normal. Neck supple.   Cardiovascular: Normal rate.   Pulmonary/Chest: Effort normal. No stridor. No respiratory distress.   Musculoskeletal: Normal range of motion.         General: Normal range of motion.   Neurological: He is alert and oriented to person, place, and time.   Skin: Skin is warm, dry, intact and no rash. Capillary refill takes less than 2 seconds. erythema No abrasion, No burn, No bruising and No ecchymosis         Comments: There is mild swelling and induration just inferior to the inferior most wound over the left tibia. There is also mild erythema surrounding the superior most wound. Mild serous drainage from the inferior wound. There is no significant fluctuance appreciated, no warmth.    Psychiatric: His speech is normal and behavior is normal. Judgment and thought content normal.   Nursing note and vitals reviewed.      Assessment:       1. Infected wound          Plan:         Infected wound  -     sulfamethoxazole-trimethoprim 800-160mg (BACTRIM DS) 800-160 mg Tab; Take 1 tablet by mouth 2 (two) times daily.  Dispense: 20 tablet; Refill: 0         Medical Decision Making:   History:   Old Medical Records: I decided to obtain old medical records.  Differential Diagnosis:   Differential diagnosis includes but is not limited to cellulitis, abscess, seroma  Urgent Care Management:  Patient presents with complaints of swelling and pain just under the inferior wound of his left lower leg.  On exam, he is afebrile and nontoxic appearing.  There is mild serous drainage noted from the wound.  Also has mild erythema surrounding the superior wound.  No definitive fluctuance appreciated.  Concerning for developing cellulitis/abscess.   This is likely related to the location of the wounds being just over the tibial area.  He was treated with Bactrim and encouraged close follow-up.  ED precautions discussed, he verbalized understanding and agreed with plan.       Patient Instructions   If not allergic,take tylenol (acetominophen) for fever control, chills, or body aches every 4 hours. Do not exceed 4000 mg/ day.If not allergic, take Motrin (Ibuprofen) every 4 hours for fever, chills, pain or inflammation. Do not exceed 2400 mg/day. You can alternate taking tylenol and motrin.     You must understand that you've received an Urgent Care treatment only and that you may be released before all your medical problems are known or treated. You, the patient, will arrange for follow up care as instructed.      Follow up with your PCP or specialty clinic as instructed in the next 2-3 days if not improved or as needed. You can call [phone] to schedule an appointment with appropriate provider.      If you condition worsens, we recommend that you receive another evaluation at the emergency room immediately or contact your primary medical clinic's after hours call service to discuss your concerns.      Please return here or go to the Emergency Department for any concerns or worsening condition.      If you were prescribed a narcotic or controlled substance, do not drive or operate heavy equipment or machinery while taking these medications.

## 2022-09-09 NOTE — PATIENT INSTRUCTIONS
If not allergic,take tylenol (acetominophen) for fever control, chills, or body aches every 4 hours. Do not exceed 4000 mg/ day.If not allergic, take Motrin (Ibuprofen) every 4 hours for fever, chills, pain or inflammation. Do not exceed 2400 mg/day. You can alternate taking tylenol and motrin.     You must understand that you've received an Urgent Care treatment only and that you may be released before all your medical problems are known or treated. You, the patient, will arrange for follow up care as instructed.      Follow up with your PCP or specialty clinic as instructed in the next 2-3 days if not improved or as needed. You can call [phone] to schedule an appointment with appropriate provider.      If you condition worsens, we recommend that you receive another evaluation at the emergency room immediately or contact your primary medical clinic's after hours call service to discuss your concerns.      Please return here or go to the Emergency Department for any concerns or worsening condition.      If you were prescribed a narcotic or controlled substance, do not drive or operate heavy equipment or machinery while taking these medications.

## 2022-09-11 ENCOUNTER — TELEPHONE (OUTPATIENT)
Dept: URGENT CARE | Facility: CLINIC | Age: 27
End: 2022-09-11
Payer: MEDICAID

## 2022-09-15 ENCOUNTER — CLINICAL SUPPORT (OUTPATIENT)
Dept: URGENT CARE | Facility: CLINIC | Age: 27
End: 2022-09-15
Payer: COMMERCIAL

## 2022-09-15 VITALS
DIASTOLIC BLOOD PRESSURE: 78 MMHG | SYSTOLIC BLOOD PRESSURE: 127 MMHG | TEMPERATURE: 98 F | HEART RATE: 72 BPM | BODY MASS INDEX: 26.29 KG/M2 | RESPIRATION RATE: 16 BRPM | WEIGHT: 154 LBS | HEIGHT: 64 IN | OXYGEN SATURATION: 97 %

## 2022-09-15 DIAGNOSIS — M79.5 FOREIGN BODY (FB) IN SOFT TISSUE: Primary | ICD-10-CM

## 2022-09-15 DIAGNOSIS — Z51.89 VISIT FOR WOUND CHECK: ICD-10-CM

## 2022-09-15 PROCEDURE — 99214 OFFICE O/P EST MOD 30 MIN: CPT | Mod: 25,S$GLB,, | Performed by: NURSE PRACTITIONER

## 2022-09-15 PROCEDURE — 10060 I&D ABSCESS SIMPLE/SINGLE: CPT | Mod: S$GLB,,, | Performed by: NURSE PRACTITIONER

## 2022-09-15 PROCEDURE — 10060 INCISION & DRAINAGE: ICD-10-PCS | Mod: S$GLB,,, | Performed by: NURSE PRACTITIONER

## 2022-09-15 PROCEDURE — 99214 PR OFFICE/OUTPT VISIT, EST, LEVL IV, 30-39 MIN: ICD-10-PCS | Mod: 25,S$GLB,, | Performed by: NURSE PRACTITIONER

## 2022-09-15 NOTE — PROGRESS NOTES
"Subjective:       Patient ID: Alton Pool is a 27 y.o. male.    Vitals:  height is 5' 4" (1.626 m) and weight is 69.9 kg (154 lb).     Chief Complaint: Leg Pain (Lt)    Pt presents a follow up on a suture removal on his left leg that has a piece of glass still in the wound from two weeks ago    Leg Pain   The incident occurred more than 1 week ago. The incident occurred at home. There was no injury mechanism. The pain is present in the left leg. The pain is at a severity of 0/10. Possible foreign bodies include glass. The symptoms are aggravated by palpation. Treatments tried: Bactroban. The treatment provided moderate relief.     Skin:  Positive for wound (foreign body).     Objective:      Physical Exam   Constitutional: He is oriented to person, place, and time. He appears well-developed. He is cooperative.  Non-toxic appearance. He does not appear ill. No distress.   HENT:   Head: Normocephalic and atraumatic.   Ears:   Right Ear: Hearing, tympanic membrane, external ear and ear canal normal.   Left Ear: Hearing, tympanic membrane, external ear and ear canal normal.   Nose: Nose normal. No mucosal edema, rhinorrhea or nasal deformity. No epistaxis. Right sinus exhibits no maxillary sinus tenderness and no frontal sinus tenderness. Left sinus exhibits no maxillary sinus tenderness and no frontal sinus tenderness.   Mouth/Throat: Uvula is midline, oropharynx is clear and moist and mucous membranes are normal. No trismus in the jaw. Normal dentition. No uvula swelling. No posterior oropharyngeal erythema.   Eyes: Conjunctivae and lids are normal. Right eye exhibits no discharge. Left eye exhibits no discharge. No scleral icterus.   Neck: Trachea normal and phonation normal. Neck supple.   Cardiovascular: Normal rate, regular rhythm, normal heart sounds and normal pulses.   Pulmonary/Chest: Effort normal and breath sounds normal. No respiratory distress.   Abdominal: Normal appearance and bowel sounds are " normal. He exhibits no distension and no mass. Soft. There is no abdominal tenderness.   Musculoskeletal: Normal range of motion.         General: No deformity. Normal range of motion.      Left lower leg: He exhibits laceration.   Neurological: He is alert and oriented to person, place, and time. He exhibits normal muscle tone. Coordination normal.   Skin: Skin is warm, dry, not diaphoretic and not pale. Lacerations - lower ext.:  left lower leg        Psychiatric: His speech is normal and behavior is normal. Judgment and thought content normal.   Nursing note and vitals reviewed.      Assessment:       No diagnosis found.      Plan:         There are no diagnoses linked to this encounter.

## 2022-09-15 NOTE — PROGRESS NOTES
Pt presented to clinic with c/o glass in wound that was initially sutured on 08/27/2022. Since then wound has healed well, sutures were removed on 09/05/2022 with a follow up visit on 09/09/2022 for wound infection. Pt polaced on Bactrim which he is still taking. RTC today for foreign body (suspected glass) working its way out of wound.     2 pieces of glass removed from wound in clinic today. Packing placed, pt instructed to continue Bactrim as prescribed.

## 2022-09-15 NOTE — PROCEDURES
"Incision & Drainage    Date/Time: 9/15/2022 9:35 AM  Performed by: Chasity Jean-Baptiste NP  Authorized by: Chasity Jean-Baptiste NP     Time out: Immediately prior to procedure a "time out" was called to verify the correct patient, procedure, equipment, support staff and site/side marked as required.      Type:  Abscess (laceration with foreign body left in wound)  Body area:  Lower extremity  Location details:  Left leg  Anesthesia:  Local infiltration  Local anesthetic: Lidocaine 1% without epinephrine  Complexity:  Simple  Drainage:  Bloody  Drainage amount:  Scant  Wound treatment:  Wound packed  Packing material:  1/4 in iodoform gauze  Patient tolerance:  Patient tolerated the procedure well with no immediate complications    2 small pieces of glass removed from wound, pic in media file, appeared square in shape and hard like windshield/car window glass  "